# Patient Record
Sex: FEMALE | Race: BLACK OR AFRICAN AMERICAN | NOT HISPANIC OR LATINO | URBAN - METROPOLITAN AREA
[De-identification: names, ages, dates, MRNs, and addresses within clinical notes are randomized per-mention and may not be internally consistent; named-entity substitution may affect disease eponyms.]

---

## 2023-06-19 ENCOUNTER — WEB ENCOUNTER (OUTPATIENT)
Dept: URBAN - METROPOLITAN AREA CLINIC 48 | Facility: CLINIC | Age: 58
End: 2023-06-19

## 2023-06-19 ENCOUNTER — OFFICE VISIT (OUTPATIENT)
Dept: URBAN - METROPOLITAN AREA CLINIC 48 | Facility: CLINIC | Age: 58
End: 2023-06-19
Payer: COMMERCIAL

## 2023-06-19 VITALS
BODY MASS INDEX: 29.63 KG/M2 | HEIGHT: 66 IN | TEMPERATURE: 97.7 F | HEART RATE: 77 BPM | SYSTOLIC BLOOD PRESSURE: 123 MMHG | DIASTOLIC BLOOD PRESSURE: 75 MMHG | WEIGHT: 184.4 LBS

## 2023-06-19 DIAGNOSIS — K59.09 CHRONIC CONSTIPATION: ICD-10-CM

## 2023-06-19 PROCEDURE — 99203 OFFICE O/P NEW LOW 30 MIN: CPT | Performed by: INTERNAL MEDICINE

## 2023-06-19 RX ORDER — AMLODIPINE BESYLATE 10 MG/1
1 TABLET TABLET ORAL ONCE A DAY
Qty: 90 TABLET | Status: ACTIVE | COMMUNITY

## 2023-06-19 RX ORDER — LINACLOTIDE 145 UG/1
1 CAPSULE AT LEAST 30 MINUTES BEFORE THE FIRST MEAL OF THE DAY ON AN EMPTY STOMACH CAPSULE, GELATIN COATED ORAL ONCE A DAY
Qty: 30 | Refills: 6 | OUTPATIENT
Start: 2023-06-19 | End: 2024-01-14

## 2023-06-19 NOTE — HPI-TODAY'S VISIT:
58 y/o female presents for evaluation of constipation which has been ongoing for about 4 years. She states she saw GI, Dr. Escalona in [place], but it is too far for her to go now. He did a colonoscopy in 2021 and the only finding she is aware of were hemorrhoids as he mentioned a banding to her. She does not have any hemorrhoid symptoms at this time. She c/o having a bowel movement about once every two weeks. At times, she may have sensation to go, and only a small amount comes out. She denies abdominal pain, bloating, blood in the stool, or abnormal weight loss. She tried fiber supplementation with mild improvement, but does not like the taste. She tried the lowest dose of Linzess with incomplete relief.

## 2023-06-19 NOTE — PHYSICAL EXAM MUSCULOSKELETAL:
PA was denied as it's not on formulary.    Alternative therapies:     Rizatriptan   Dihydroergotamin Mesylate Solution 1mg/ml inj  Dihydroergotamine Mesylate solution 4mg/mL nasal       Please advise    normal gait and station , no tenderness or deformities present

## 2023-08-14 ENCOUNTER — DASHBOARD ENCOUNTERS (OUTPATIENT)
Age: 58
End: 2023-08-14

## 2023-08-14 ENCOUNTER — OFFICE VISIT (OUTPATIENT)
Dept: URBAN - METROPOLITAN AREA CLINIC 48 | Facility: CLINIC | Age: 58
End: 2023-08-14

## 2023-08-14 RX ORDER — PEN NEEDLE, DIABETIC 32GX 5/32"
AS DIRECTED NEEDLE, DISPOSABLE MISCELLANEOUS
Qty: 100 EACH | Status: ACTIVE | COMMUNITY

## 2023-08-14 RX ORDER — LIRAGLUTIDE 6 MG/ML
AS DIRECTED INJECTION, SOLUTION SUBCUTANEOUS
Status: ACTIVE | COMMUNITY

## 2023-08-14 RX ORDER — LINACLOTIDE 145 UG/1
1 CAPSULE AT LEAST 30 MINUTES BEFORE THE FIRST MEAL OF THE DAY ON AN EMPTY STOMACH CAPSULE, GELATIN COATED ORAL ONCE A DAY
Qty: 30 | Refills: 6 | Status: ACTIVE | COMMUNITY
Start: 2023-06-19 | End: 2024-01-14

## 2023-08-14 RX ORDER — DOXYCYCLINE HYCLATE 100 MG/1
1 TABLET TABLET ORAL ONCE A DAY
Qty: 7 TABLET | Status: ACTIVE | COMMUNITY

## 2023-08-14 RX ORDER — HYDROCHLOROTHIAZIDE 12.5 MG/1
1 CAPSULE IN THE MORNING CAPSULE ORAL ONCE A DAY
Qty: 90 CAPSULE | Status: ACTIVE | COMMUNITY

## 2023-08-14 RX ORDER — AMLODIPINE BESYLATE 10 MG/1
1 TABLET TABLET ORAL ONCE A DAY
Qty: 90 TABLET | Status: ACTIVE | COMMUNITY